# Patient Record
Sex: FEMALE | HISPANIC OR LATINO | URBAN - METROPOLITAN AREA
[De-identification: names, ages, dates, MRNs, and addresses within clinical notes are randomized per-mention and may not be internally consistent; named-entity substitution may affect disease eponyms.]

---

## 2023-05-17 ENCOUNTER — APPOINTMENT (RX ONLY)
Dept: URBAN - METROPOLITAN AREA CLINIC 146 | Facility: CLINIC | Age: 58
Setting detail: DERMATOLOGY
End: 2023-05-17

## 2023-05-17 DIAGNOSIS — C50 MALIGNANT NEOPLASM OF BREAST: ICD-10-CM

## 2023-05-17 PROBLEM — C50.919 MALIGNANT NEOPLASM OF UNSPECIFIED SITE OF UNSPECIFIED FEMALE BREAST: Status: ACTIVE | Noted: 2023-05-17

## 2023-05-17 PROCEDURE — 99214 OFFICE O/P EST MOD 30 MIN: CPT

## 2023-05-17 PROCEDURE — ? COUNSELING -  BREAST RECONSTRUCTION

## 2023-05-17 NOTE — HPI: BREAST RECONSTRUCTION CONSULTATION
What Is Your Pre-Diagnosis Bra Size (Numeric)?:  Michael Joseph
Is This A New Presentation, Or A Follow-Up?: Breast Reconstruction Consultation
Who Referred You?: Dr. Edyth Dance
When Was The Diagnosis Confirmed?: [address]

## 2023-06-16 ENCOUNTER — RX ONLY (OUTPATIENT)
Age: 58
Setting detail: RX ONLY
End: 2023-06-16

## 2023-06-16 RX ORDER — ONDANSETRON 8 MG/1
TABLET, ORALLY DISINTEGRATING ORAL
Qty: 8 | Refills: 0 | Status: ERX | COMMUNITY
Start: 2023-06-16

## 2023-06-16 RX ORDER — OXYCODONE HYDROCHLORIDE AND ACETAMINOPHEN 5; 325 MG/1; MG/1
TABLET ORAL
Qty: 18 | Refills: 0 | Status: ERX | COMMUNITY
Start: 2023-06-16

## 2023-06-16 RX ORDER — CEPHALEXIN 500 MG/1
CAPSULE ORAL
Qty: 42 | Refills: 0 | Status: ERX | COMMUNITY
Start: 2023-06-16

## 2023-06-21 ENCOUNTER — APPOINTMENT (RX ONLY)
Dept: URBAN - METROPOLITAN AREA CLINIC 146 | Facility: CLINIC | Age: 58
Setting detail: DERMATOLOGY
End: 2023-06-21

## 2023-06-21 DIAGNOSIS — Z48.89 ENCOUNTER FOR OTHER SPECIFIED SURGICAL AFTERCARE: ICD-10-CM

## 2023-06-21 PROCEDURE — ? DRAIN REMOVAL

## 2023-06-21 ASSESSMENT — LOCATION SIMPLE DESCRIPTION DERM
LOCATION SIMPLE: RIGHT BREAST
LOCATION SIMPLE: LEFT BREAST

## 2023-06-21 ASSESSMENT — LOCATION DETAILED DESCRIPTION DERM
LOCATION DETAILED: LEFT MEDIAL BREAST 7-8:00 REGION
LOCATION DETAILED: RIGHT LATERAL BREAST 6-7:00 REGION

## 2023-06-21 ASSESSMENT — LOCATION ZONE DERM: LOCATION ZONE: TRUNK

## 2023-06-28 ENCOUNTER — APPOINTMENT (RX ONLY)
Dept: URBAN - METROPOLITAN AREA CLINIC 146 | Facility: CLINIC | Age: 58
Setting detail: DERMATOLOGY
End: 2023-06-28

## 2023-06-28 DIAGNOSIS — Z48.89 ENCOUNTER FOR OTHER SPECIFIED SURGICAL AFTERCARE: ICD-10-CM

## 2023-06-28 PROCEDURE — 99024 POSTOP FOLLOW-UP VISIT: CPT

## 2023-06-28 PROCEDURE — ? SUTURE REMOVAL

## 2023-06-28 PROCEDURE — ? ADDITIONAL NOTES

## 2023-06-28 ASSESSMENT — LOCATION SIMPLE DESCRIPTION DERM
LOCATION SIMPLE: RIGHT BREAST
LOCATION SIMPLE: RIGHT BREAST
LOCATION SIMPLE: LEFT BREAST
LOCATION SIMPLE: LEFT BREAST

## 2023-06-28 ASSESSMENT — LOCATION DETAILED DESCRIPTION DERM
LOCATION DETAILED: RIGHT MEDIAL BREAST 5-6:00 REGION
LOCATION DETAILED: LEFT MEDIAL BREAST 6-7:00 REGION
LOCATION DETAILED: RIGHT LATERAL BREAST 6-7:00 REGION
LOCATION DETAILED: LEFT LATERAL BREAST 5-6:00 REGION

## 2023-06-28 ASSESSMENT — LOCATION ZONE DERM
LOCATION ZONE: TRUNK
LOCATION ZONE: TRUNK

## 2023-06-28 NOTE — PROCEDURE: ADDITIONAL NOTES
Render Risk Assessment In Note?: no
Additional Notes: Small blister on right breast. Provider advised patient to apply ointment and bandaid until all healed. Ointment and bandaid applied today during office visit. She will return in 1 week to remove remaining sutures.

## 2023-07-05 ENCOUNTER — APPOINTMENT (RX ONLY)
Dept: URBAN - METROPOLITAN AREA CLINIC 146 | Facility: CLINIC | Age: 58
Setting detail: DERMATOLOGY
End: 2023-07-05

## 2023-07-05 DIAGNOSIS — Z48.89 ENCOUNTER FOR OTHER SPECIFIED SURGICAL AFTERCARE: ICD-10-CM

## 2023-07-05 PROCEDURE — ? SUTURE REMOVAL

## 2023-07-05 PROCEDURE — 99024 POSTOP FOLLOW-UP VISIT: CPT

## 2023-07-05 ASSESSMENT — LOCATION SIMPLE DESCRIPTION DERM
LOCATION SIMPLE: RIGHT BREAST
LOCATION SIMPLE: LEFT BREAST

## 2023-07-05 ASSESSMENT — LOCATION DETAILED DESCRIPTION DERM
LOCATION DETAILED: RIGHT LATERAL BREAST 6-7:00 REGION
LOCATION DETAILED: LEFT MEDIAL BREAST 7-8:00 REGION

## 2023-07-05 ASSESSMENT — LOCATION ZONE DERM: LOCATION ZONE: TRUNK

## 2023-07-19 ENCOUNTER — APPOINTMENT (RX ONLY)
Dept: URBAN - METROPOLITAN AREA CLINIC 146 | Facility: CLINIC | Age: 58
Setting detail: DERMATOLOGY
End: 2023-07-19

## 2023-07-19 DIAGNOSIS — Z48.89 ENCOUNTER FOR OTHER SPECIFIED SURGICAL AFTERCARE: ICD-10-CM

## 2023-07-19 PROCEDURE — ? COUNSELING - POST-OP CHECK, BREAST REDUCTION

## 2023-07-19 PROCEDURE — 99024 POSTOP FOLLOW-UP VISIT: CPT

## 2023-07-19 NOTE — PROCEDURE: COUNSELING - POST-OP CHECK, BREAST REDUCTION
Count Minor/Major Decisions Toward Mdm (Not Cosmetic)?: No
Add Postop Global No-Charge Code (27031)?: yes
Detail Level: Simple

## 2023-07-19 NOTE — HPI: POST-OP CHECK, BREAST REDUCTION
History: patient presents today for follow up from bilateral breast reduction reconstruction.  Surgery was completed on 6/20/23

## 2023-09-13 ENCOUNTER — APPOINTMENT (RX ONLY)
Dept: URBAN - METROPOLITAN AREA CLINIC 146 | Facility: CLINIC | Age: 58
Setting detail: DERMATOLOGY
End: 2023-09-13

## 2023-09-13 DIAGNOSIS — Z48.89 ENCOUNTER FOR OTHER SPECIFIED SURGICAL AFTERCARE: ICD-10-CM

## 2023-09-13 PROCEDURE — ? POST-OP CHECK

## 2023-09-13 PROCEDURE — ? ADDITIONAL NOTES

## 2023-09-13 PROCEDURE — 99024 POSTOP FOLLOW-UP VISIT: CPT

## 2023-09-13 ASSESSMENT — LOCATION DETAILED DESCRIPTION DERM
LOCATION DETAILED: RIGHT LATERAL BREAST 6-7:00 REGION
LOCATION DETAILED: LEFT MEDIAL BREAST 6-7:00 REGION

## 2023-09-13 ASSESSMENT — LOCATION ZONE DERM: LOCATION ZONE: TRUNK

## 2023-09-13 ASSESSMENT — LOCATION SIMPLE DESCRIPTION DERM
LOCATION SIMPLE: LEFT BREAST
LOCATION SIMPLE: RIGHT BREAST

## 2023-09-13 NOTE — PROCEDURE: ADDITIONAL NOTES
Render Risk Assessment In Note?: no
Additional Notes: Hypertrophic scarring on bilateral breasts noted on examination. Plan for steroid injections at next visit and revision of right medial inframammary fold.

## 2023-09-13 NOTE — PROCEDURE: POST-OP CHECK
Detail Level: Detailed
Add Postop Global No-Charge Code (82747)?: yes
Wound Evaluated By: Dr. Shawn Mitchell
Wound Evaluated By: Dr. Kristen Rodney

## 2023-10-17 ENCOUNTER — APPOINTMENT (RX ONLY)
Dept: URBAN - METROPOLITAN AREA CLINIC 146 | Facility: CLINIC | Age: 58
Setting detail: DERMATOLOGY
End: 2023-10-17

## 2023-10-17 VITALS — DIASTOLIC BLOOD PRESSURE: 80 MMHG | HEART RATE: 85 BPM | SYSTOLIC BLOOD PRESSURE: 118 MMHG

## 2023-10-17 DIAGNOSIS — L90.5 SCAR CONDITIONS AND FIBROSIS OF SKIN: ICD-10-CM

## 2023-10-17 PROCEDURE — 12032 INTMD RPR S/A/T/EXT 2.6-7.5: CPT

## 2023-10-17 PROCEDURE — 11403 EXC TR-EXT B9+MARG 2.1-3CM: CPT

## 2023-10-17 PROCEDURE — ? INTRALESIONAL KENALOG

## 2023-10-17 PROCEDURE — ? SCAR REVISION AND EXCISION

## 2023-10-17 PROCEDURE — 11900 INJECT SKIN LESIONS </W 7: CPT | Mod: 59

## 2023-10-17 ASSESSMENT — LOCATION DETAILED DESCRIPTION DERM
LOCATION DETAILED: LEFT INFRAMAMMARY CREASE (OUTER QUADRANT)
LOCATION DETAILED: RIGHT INFRAMAMMARY CREASE (OUTER QUADRANT)

## 2023-10-17 ASSESSMENT — LOCATION SIMPLE DESCRIPTION DERM
LOCATION SIMPLE: RIGHT BREAST
LOCATION SIMPLE: LEFT BREAST

## 2023-10-17 ASSESSMENT — LOCATION ZONE DERM: LOCATION ZONE: TRUNK

## 2023-10-17 NOTE — PROCEDURE: INTRALESIONAL KENALOG
Lot # (Optional): [id number]
Detail Level: Detailed
Kenalog Preparation: kenalog
Bill For Wasted Drug?: no
Concentration (Mg/Ml): 10
Expiration Date (Optional): 09/2024
Consent: The risks of atrophy were reviewed with the patient.
Administered By (Optional): Dr. Dylan Lynne
Total Volume (Ccs): 1
Kenalog Type Of Vial: Multiple Dose

## 2023-10-17 NOTE — PROCEDURE: SCAR REVISION AND EXCISION
Detail Level: Detailed
Size Of Lesion In Cm: 2
X Size Of Lesion In Cm (Optional): 0
Size Of Margin In Cm: 0.2
Excision Depth: adipose tissue
Scalpel Size: 15 blade
Excision Method: Elliptical
Anesthesia Type: 1% lidocaine with epinephrine
Additional Anesthesia Volume In Cc: 6
Hemostasis: Manual Pressure
Estimated Blood Loss (Cc): minimal
Repair Type: Intermediate
Intermediate / Complex Repair - Final Wound Length In Cm: 6.5
Complex Requirements: Extensive Undermining Performed?: No
Undermining Type: Entire Wound
Debridement Text: The wound edges were debrided prior to proceeding with the closure to facilitate wound healing.
Helical Rim Text: The closure involved the helical rim.
Vermilion Border Text: The closure involved the vermilion border.
Nostril Rim Text: The closure involved the nostril rim.
Retention Suture Text: Retention sutures were placed to support the closure and prevent dehiscence.
Deep Sutures: 4-0 Vicryl
Epidermal Sutures: 4-0 Prolene
Epidermal Closure: simple interrupted
Wound Care: No ointment
Dressing: steri-strips and pressure dressing
Suture Removal: 10 days
Path Notes (To The Dermatopathologist): Please check margins.
Medical Necessity Clause: This procedure was medically necessary because the lesion that was treated was:
Consent was obtained from the patient. The risks and benefits to therapy were discussed in detail. Specifically, the risks of infection, scarring, bleeding, prolonged wound healing, incomplete removal, allergy to anesthesia, nerve injury and recurrence were addressed. Prior to the procedure, the treatment site was clearly identified and confirmed by the patient. All components of Universal Protocol/PAUSE Rule completed.
Post-Care Instructions: I reviewed with the patient in detail post-care instructions. Patient is not to engage in any heavy lifting, exercise, or swimming for the next 14 days. Should the patient develop any fevers, chills, bleeding, severe pain patient will contact the office immediately.
Add Superficial Fascia When Documenting Dermal Sutures?: Yes
Billing Type: [employer]

## 2023-10-27 ENCOUNTER — APPOINTMENT (RX ONLY)
Dept: URBAN - METROPOLITAN AREA CLINIC 146 | Facility: CLINIC | Age: 58
Setting detail: DERMATOLOGY
End: 2023-10-27

## 2023-10-27 DIAGNOSIS — Z48.02 ENCOUNTER FOR REMOVAL OF SUTURES: ICD-10-CM

## 2023-10-27 PROCEDURE — 99024 POSTOP FOLLOW-UP VISIT: CPT

## 2023-10-27 PROCEDURE — ? SUTURE REMOVAL

## 2023-10-27 ASSESSMENT — LOCATION SIMPLE DESCRIPTION DERM
LOCATION SIMPLE: RIGHT BREAST
LOCATION SIMPLE: LEFT BREAST

## 2023-10-27 ASSESSMENT — LOCATION DETAILED DESCRIPTION DERM
LOCATION DETAILED: RIGHT MEDIAL BREAST 5-6:00 REGION
LOCATION DETAILED: LEFT MEDIAL BREAST 6-7:00 REGION

## 2023-10-27 ASSESSMENT — LOCATION ZONE DERM: LOCATION ZONE: TRUNK

## 2023-10-27 NOTE — HPI: POST-OP CHECK
History: Patient presented today for a post op check and removal of stitches for bilateral breasts.  Surgery was completed on 10/17/23

## 2023-10-31 ENCOUNTER — APPOINTMENT (RX ONLY)
Dept: URBAN - METROPOLITAN AREA CLINIC 146 | Facility: CLINIC | Age: 58
Setting detail: DERMATOLOGY
End: 2023-10-31

## 2023-10-31 DIAGNOSIS — Z48.89 ENCOUNTER FOR OTHER SPECIFIED SURGICAL AFTERCARE: ICD-10-CM

## 2023-10-31 PROCEDURE — ? POST-OP CHECK

## 2023-10-31 PROCEDURE — 99024 POSTOP FOLLOW-UP VISIT: CPT

## 2023-10-31 ASSESSMENT — LOCATION SIMPLE DESCRIPTION DERM
LOCATION SIMPLE: RIGHT BREAST
LOCATION SIMPLE: RIGHT BREAST

## 2023-10-31 ASSESSMENT — LOCATION ZONE DERM
LOCATION ZONE: TRUNK
LOCATION ZONE: TRUNK

## 2023-10-31 ASSESSMENT — LOCATION DETAILED DESCRIPTION DERM
LOCATION DETAILED: RIGHT MEDIAL BREAST 5-6:00 REGION
LOCATION DETAILED: RIGHT MEDIAL BREAST 5-6:00 REGION

## 2023-10-31 NOTE — PROCEDURE: POST-OP CHECK
Body Location Override (Optional): right medial breast
Detail Level: Zone
Add Postop Global No-Charge Code (40811)?: yes
Wound Evaluated By: Ary Hawk MD

## 2023-11-29 ENCOUNTER — APPOINTMENT (RX ONLY)
Dept: URBAN - METROPOLITAN AREA CLINIC 146 | Facility: CLINIC | Age: 58
Setting detail: DERMATOLOGY
End: 2023-11-29

## 2023-11-29 DIAGNOSIS — Z48.89 ENCOUNTER FOR OTHER SPECIFIED SURGICAL AFTERCARE: ICD-10-CM

## 2023-11-29 PROCEDURE — 99024 POSTOP FOLLOW-UP VISIT: CPT

## 2023-11-29 PROCEDURE — ? POST-OP CHECK

## 2023-11-29 ASSESSMENT — LOCATION DETAILED DESCRIPTION DERM
LOCATION DETAILED: RIGHT MEDIAL BREAST 5-6:00 REGION
LOCATION DETAILED: LEFT MEDIAL BREAST 6-7:00 REGION
LOCATION DETAILED: RIGHT MEDIAL BREAST 5-6:00 REGION

## 2023-11-29 ASSESSMENT — LOCATION SIMPLE DESCRIPTION DERM
LOCATION SIMPLE: RIGHT BREAST
LOCATION SIMPLE: RIGHT BREAST
LOCATION SIMPLE: LEFT BREAST

## 2023-11-29 ASSESSMENT — LOCATION ZONE DERM
LOCATION ZONE: TRUNK
LOCATION ZONE: TRUNK

## 2023-11-29 NOTE — PROCEDURE: POST-OP CHECK
Body Location Override (Optional): right medial breast
Detail Level: Zone
Add Postop Global No-Charge Code (13656)?: yes
Wound Evaluated By: Katelin Vela MD
Detail Level: Detailed
Wound Evaluated By: Dr. Romana Martinez